# Patient Record
Sex: FEMALE | Race: WHITE | URBAN - METROPOLITAN AREA
[De-identification: names, ages, dates, MRNs, and addresses within clinical notes are randomized per-mention and may not be internally consistent; named-entity substitution may affect disease eponyms.]

---

## 2022-10-24 ENCOUNTER — OFFICE VISIT (OUTPATIENT)
Dept: URBAN - METROPOLITAN AREA CLINIC 92 | Facility: CLINIC | Age: 27
End: 2022-10-24
Payer: COMMERCIAL

## 2022-10-24 ENCOUNTER — WEB ENCOUNTER (OUTPATIENT)
Dept: URBAN - METROPOLITAN AREA CLINIC 92 | Facility: CLINIC | Age: 27
End: 2022-10-24

## 2022-10-24 ENCOUNTER — DASHBOARD ENCOUNTERS (OUTPATIENT)
Age: 27
End: 2022-10-24

## 2022-10-24 VITALS
HEART RATE: 79 BPM | DIASTOLIC BLOOD PRESSURE: 64 MMHG | HEIGHT: 65 IN | SYSTOLIC BLOOD PRESSURE: 110 MMHG | TEMPERATURE: 97.7 F | BODY MASS INDEX: 29.32 KG/M2 | WEIGHT: 176 LBS

## 2022-10-24 DIAGNOSIS — K62.5 RECTAL BLEEDING: ICD-10-CM

## 2022-10-24 PROCEDURE — 99204 OFFICE O/P NEW MOD 45 MIN: CPT | Performed by: INTERNAL MEDICINE

## 2022-10-24 NOTE — HPI-TODAY'S VISIT:
26 yo female for evaluatio of rectal bleeding. minor on TP. no anal or rectal pain. Bm otherwise normal. no weight loss.

## 2022-11-08 ENCOUNTER — OFFICE VISIT (OUTPATIENT)
Dept: URBAN - METROPOLITAN AREA SURGERY CENTER 16 | Facility: SURGERY CENTER | Age: 27
End: 2022-11-08
Payer: COMMERCIAL

## 2022-11-08 DIAGNOSIS — D12.2 ADENOMA OF ASCENDING COLON: ICD-10-CM

## 2022-11-08 DIAGNOSIS — K62.5 ANAL BLEEDING: ICD-10-CM

## 2022-11-08 PROCEDURE — G8907 PT DOC NO EVENTS ON DISCHARG: HCPCS | Performed by: INTERNAL MEDICINE

## 2022-11-08 PROCEDURE — 45385 COLONOSCOPY W/LESION REMOVAL: CPT | Performed by: INTERNAL MEDICINE

## 2022-11-08 PROCEDURE — 45381 COLONOSCOPY SUBMUCOUS NJX: CPT | Performed by: INTERNAL MEDICINE
